# Patient Record
Sex: FEMALE | Race: ASIAN | NOT HISPANIC OR LATINO
[De-identification: names, ages, dates, MRNs, and addresses within clinical notes are randomized per-mention and may not be internally consistent; named-entity substitution may affect disease eponyms.]

---

## 2020-01-14 ENCOUNTER — APPOINTMENT (OUTPATIENT)
Dept: UROLOGY | Facility: CLINIC | Age: 60
End: 2020-01-14
Payer: MEDICAID

## 2020-01-14 VITALS
OXYGEN SATURATION: 95 % | DIASTOLIC BLOOD PRESSURE: 90 MMHG | RESPIRATION RATE: 17 BRPM | SYSTOLIC BLOOD PRESSURE: 147 MMHG | HEART RATE: 75 BPM | BODY MASS INDEX: 29.23 KG/M2 | TEMPERATURE: 97.6 F | HEIGHT: 63 IN | WEIGHT: 165 LBS

## 2020-01-14 DIAGNOSIS — Z80.0 FAMILY HISTORY OF MALIGNANT NEOPLASM OF DIGESTIVE ORGANS: ICD-10-CM

## 2020-01-14 DIAGNOSIS — Z78.9 OTHER SPECIFIED HEALTH STATUS: ICD-10-CM

## 2020-01-14 PROCEDURE — 99204 OFFICE O/P NEW MOD 45 MIN: CPT

## 2020-01-14 NOTE — LETTER BODY
[FreeTextEntry1] : Camilla Connelly MD\par [address]\[place] [address]\par [phone]\par \par Dear Dr. Connelly,\par \par Reason for visit: Abnormal imaging. 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 Patient underwent abdominal MRI, which demonstrated possible benign adrenal adenoma. Patient denies any flank pain, hematuria, or urinary difficulties. Patient denies any urinary incontinence. The patient denies any aggravating or relieving factors. The patient denies any interference of function. The patient is entirely asymptomatic. All other review of systems are negative. She has a family medical history of colon cancer. She has previously undergone hysterectomy. Past medical history, family history and social history were inquired and were noncontributory to current condition. The patient does not use tobacco or drink alcohol. Medications and allergies were reviewed. She has no known allergies to medication. \par \par On examination, the patient is a healthy-appearing woman in no acute distress. She is alert and oriented follows commands. She has normal mood and affect. She is normocephalic. Neck is supple. Oral no thrush. Respirations are unlabored. Abdomen is soft and nontender. Bladder is nonpalpable. No CVA tenderness. Neurologically she is grossly intact. No peripheral edema. Skin without gross abnormality.\par \par Her abdominal MRI demonstrated a 1.4 benign left adrenal adenoma and bilateral renal cysts. There was also a subcentimeter right hepatic hemangioma and scattered hepatic cysts.\par \par Assessment: Abnormal imaging. Possible benign left adrenal adenoma.\par \par I counseled the patient. I recommended she obtain renal panel to evaluate for her adrenal function and ensure that the adenoma is benign. I counseled the patient regarding the procedure. The risks and benefits were discussed. Alternatives were given. I answered the patient questions. The patient will follow-up as directed and will contact me with any questions or concerns. Thank you for the opportunity to participate in the care of Ms. KEATING. I will keep you updated on her progress.\par \par Plan: Renal panel. Follow up in 6 months with US

## 2020-01-14 NOTE — ADDENDUM
[FreeTextEntry1] : Entered by Dante Vasques, acting as scribe for Dr. David Meyers.\par \par The documentation recorded by the scribe accurately reflects the service I personally performed and the decisions made by me.

## 2020-01-14 NOTE — PHYSICAL EXAM
[General Appearance - Well Developed] : well developed [General Appearance - Well Nourished] : well nourished [Normal Appearance] : normal appearance [Well Groomed] : well groomed [General Appearance - In No Acute Distress] : no acute distress [Edema] : no peripheral edema [Exaggerated Use Of Accessory Muscles For Inspiration] : no accessory muscle use [Respiration, Rhythm And Depth] : normal respiratory rhythm and effort [Costovertebral Angle Tenderness] : no ~M costovertebral angle tenderness [Abdomen Soft] : soft [Abdomen Tenderness] : non-tender [Urinary Bladder Findings] : the bladder was normal on palpation [] : no rash [Normal Station and Gait] : the gait and station were normal for the patient's age [No Focal Deficits] : no focal deficits [Oriented To Time, Place, And Person] : oriented to person, place, and time [Affect] : the affect was normal [Not Anxious] : not anxious [No Palpable Adenopathy] : no palpable adenopathy [Mood] : the mood was normal

## 2020-01-19 LAB
ACTH SER-ACNC: 7.4 PG/ML
ALBUMIN SERPL ELPH-MCNC: 4.3 G/DL
ALDOSTERONE SERUM: 4.8 NG/DL
ALP BLD-CCNC: 77 U/L
ALT SERPL-CCNC: 20 U/L
ANION GAP SERPL CALC-SCNC: 15 MMOL/L
APPEARANCE: CLEAR
AST SERPL-CCNC: 19 U/L
BACTERIA: NEGATIVE
BILIRUB SERPL-MCNC: 0.4 MG/DL
BILIRUBIN URINE: NEGATIVE
BLOOD URINE: ABNORMAL
BUN SERPL-MCNC: 12 MG/DL
CALCIUM SERPL-MCNC: 9.3 MG/DL
CHLORIDE SERPL-SCNC: 104 MMOL/L
CO2 SERPL-SCNC: 22 MMOL/L
COLOR: NORMAL
CORTIS SERPL-MCNC: 9 UG/DL
CREAT SERPL-MCNC: 0.5 MG/DL
DHEA-SULFATE, SERUM: 121 UG/DL
GLUCOSE QUALITATIVE U: NEGATIVE
GLUCOSE SERPL-MCNC: 92 MG/DL
HYALINE CASTS: 0 /LPF
KETONES URINE: NEGATIVE
LEUKOCYTE ESTERASE URINE: NEGATIVE
METANEPHRINE, PL: <10 PG/ML
MICROSCOPIC-UA: NORMAL
NITRITE URINE: NEGATIVE
NORMETANEPHRINE, PL: 49 PG/ML
PH URINE: 7
POTASSIUM SERPL-SCNC: 4.4 MMOL/L
PROT SERPL-MCNC: 6.6 G/DL
PROTEIN URINE: NEGATIVE
RED BLOOD CELLS URINE: 5 /HPF
RENIN PLASMA: 6.9 PG/ML
SODIUM SERPL-SCNC: 141 MMOL/L
SPECIFIC GRAVITY URINE: 1.02
SQUAMOUS EPITHELIAL CELLS: 0 /HPF
UROBILINOGEN URINE: NORMAL
WHITE BLOOD CELLS URINE: 0 /HPF

## 2020-01-21 ENCOUNTER — CLINICAL ADVICE (OUTPATIENT)
Age: 60
End: 2020-01-21

## 2020-01-21 ENCOUNTER — APPOINTMENT (OUTPATIENT)
Dept: UROLOGY | Facility: CLINIC | Age: 60
End: 2020-01-21
Payer: MEDICAID

## 2020-01-21 VITALS
HEART RATE: 80 BPM | SYSTOLIC BLOOD PRESSURE: 146 MMHG | WEIGHT: 165 LBS | RESPIRATION RATE: 17 BRPM | DIASTOLIC BLOOD PRESSURE: 84 MMHG | TEMPERATURE: 98.3 F | OXYGEN SATURATION: 97 % | BODY MASS INDEX: 29.23 KG/M2

## 2020-01-21 PROCEDURE — 52000 CYSTOURETHROSCOPY: CPT

## 2020-01-21 PROCEDURE — 76775 US EXAM ABDO BACK WALL LIM: CPT

## 2020-01-21 RX ORDER — CIPROFLOXACIN HYDROCHLORIDE 500 MG/1
500 TABLET, FILM COATED ORAL
Refills: 0 | Status: COMPLETED | OUTPATIENT
Start: 2020-01-21

## 2020-01-21 RX ORDER — CIPROFLOXACIN HYDROCHLORIDE 500 MG/1
500 TABLET, FILM COATED ORAL
Qty: 2 | Refills: 0 | Status: ACTIVE | COMMUNITY
Start: 2020-01-21

## 2020-01-21 NOTE — LETTER BODY
[FreeTextEntry1] : Camilla Connelly MD\par [address]\[place] [address]\par [phone]\par \par Dear Dr. Connelly,\par \par Reason for visit: Abnormal imaging. 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 Patient underwent abdominal MRI, which demonstrated possible benign adrenal adenoma. Patient returns today for cystoscopy and renal ultrasound. Since her last visit, she denies any changes in health and offers no interval complaints. Past medical history, family history and social history were unchanged. Medications and allergies were reviewed. She has no known allergies to medication. \par \par On examination, the patient is a healthy-appearing woman in no acute distress. She is alert and oriented follows commands. She has normal mood and affect. She is normocephalic. Neck is supple. Oral no thrush. Respirations are unlabored. Abdomen is soft and nontender. Bladder is nonpalpable. No CVA tenderness. Neurologically she is grossly intact. No peripheral edema. Skin without gross abnormality.\par \par I personally reviewed ultrasound images with the patient today. Images demonstrated a 2.0 cm exophytic simple cyst visualized off the left mid pole.  Both kidneys appear normal in size and echogenicity. No stones, solid masses or hydronephrosis visualized.\par Cystoscopy today was unremarkable. \par \par Assessment: Abnormal imaging. Possible benign left adrenal adenoma. \par \par I counseled the patient. Patient's ultrasound today was stable and her cystoscopy today were unremarkable. I recommend she follow up in 6 months to ensure stability. Patient understands that if she develops gross hematuria or any urinary discomfort, she will contact me for further evaluation. The risks and benefits were discussed. Alternatives were given. I answered the patient questions. The patient will follow-up as directed and will contact me with any questions or concerns. Thank you for the opportunity to participate in the care of Ms. KEATING. I will keep you updated on her progress.\par \par Plan:  Follow up in 6 months.\par

## 2020-01-21 NOTE — ADDENDUM
[FreeTextEntry1] : Entered by El Fong, acting as scribe for Dr. David Meyers.\par \par The documentation recorded by the scribe accurately reflects the service I personally performed and the decisions made by me.

## 2020-01-24 LAB
CORTIS 24H UR-MCNC: 24 H
CORTIS 24H UR-MRATE: 55 MCG/24 H
METAINT: 24 H
METANEPH 24H UR-MRATE: 84 MCG/24 H
METANEPH UR-MCNC: 925 ML
METANEPHS UR-MCNC: 520 MCG/24 H
NORMETANEPHRINE 24H UR-MCNC: 436 MCG/24 H
SPECIMEN VOL 24H UR: 925 ML
SPECIMEN VOL 24H UR: 925 ML
URINE CYTOLOGY: NORMAL
VMA 24H UR-MCNC: 4.3 MG/24 H
VMA SERPL-MCNC: 24 H

## 2020-01-27 LAB
DOPAMINE UR-MCNC: 245 UG/L
DOPAMINE, UR, 24HR: 227 UG/24 HR
EPINEPH UR-MCNC: 6 UG/L
EPINEPHRINE, U, 24HR: 6 UG/24 HR
NOREPINEPH UR-MCNC: 71 UG/L
NOREPINEPHRINE,U,24H: 66 UG/24 HR

## 2020-08-14 ENCOUNTER — APPOINTMENT (OUTPATIENT)
Dept: UROLOGY | Facility: CLINIC | Age: 60
End: 2020-08-14
Payer: MEDICAID

## 2020-08-14 VITALS
BODY MASS INDEX: 29.41 KG/M2 | OXYGEN SATURATION: 97 % | WEIGHT: 166 LBS | TEMPERATURE: 97.8 F | RESPIRATION RATE: 17 BRPM | SYSTOLIC BLOOD PRESSURE: 139 MMHG | HEART RATE: 101 BPM | DIASTOLIC BLOOD PRESSURE: 81 MMHG

## 2020-08-14 PROCEDURE — 99213 OFFICE O/P EST LOW 20 MIN: CPT

## 2020-08-17 NOTE — LETTER BODY
[FreeTextEntry1] : Camilla Connelly MD\par [address]\[place] [address]\par [phone]\par \par Dear Dr. Connelly,\par \par Reason for visit: Abnormal imaging. Left adrenal adenoma. 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 Patient underwent abdominal MRI in December 2019, which demonstrated benign adrenal adenoma. She obtained a negative hematuria evaluation last visit in January. Her workup including cytology, renal ultrasound, and cystoscopy were unremarkable. Patient returns today for follow-up. Since her last visit, she has been well. She offers no interval changes in health or urinary complaints. Past medical history, family history and social history were unchanged. Medications and allergies were reviewed. She has no known allergies to medication. \par \par On examination, the patient is a healthy-appearing woman in no acute distress. She is alert and oriented follows commands. She has normal mood and affect. She is normocephalic. Neck is supple. Oral no thrush. Respirations are unlabored. Abdomen is soft and nontender. Bladder is nonpalpable. No CVA tenderness. Neurologically she is grossly intact. No peripheral edema. Skin without gross abnormality.\par \par Assessment: Abnormal imaging. Microscopic hematuria, resolved.\par \par I counseled the patient. I reassured her as her hematuria evaluation was negative. I recommend she follow up in a year to ensure stability. Patient understands that if she develops gross hematuria or any urinary discomfort, she will contact me for further evaluation. The risks and benefits were discussed. Alternatives were given. I answered the patient questions. The patient will follow-up as directed and will contact me with any questions or concerns. Thank you for the opportunity to participate in the care of Ms. KEATING. I will keep you updated on her progress.\par \par Plan: Follow up in 1 year.

## 2021-02-23 ENCOUNTER — APPOINTMENT (OUTPATIENT)
Dept: UROLOGY | Facility: CLINIC | Age: 61
End: 2021-02-23
Payer: MEDICAID

## 2021-02-23 VITALS
HEART RATE: 88 BPM | TEMPERATURE: 98 F | RESPIRATION RATE: 18 BRPM | SYSTOLIC BLOOD PRESSURE: 121 MMHG | OXYGEN SATURATION: 97 % | DIASTOLIC BLOOD PRESSURE: 82 MMHG | WEIGHT: 158 LBS | BODY MASS INDEX: 27.99 KG/M2

## 2021-02-23 DIAGNOSIS — D35.02 BENIGN NEOPLASM OF LEFT ADRENAL GLAND: ICD-10-CM

## 2021-02-23 DIAGNOSIS — R31.21 ASYMPTOMATIC MICROSCOPIC HEMATURIA: ICD-10-CM

## 2021-02-23 DIAGNOSIS — Z00.00 ENCOUNTER FOR GENERAL ADULT MEDICAL EXAMINATION W/OUT ABNORMAL FINDINGS: ICD-10-CM

## 2021-02-23 PROCEDURE — 99072 ADDL SUPL MATRL&STAF TM PHE: CPT

## 2021-02-23 PROCEDURE — 99214 OFFICE O/P EST MOD 30 MIN: CPT

## 2021-02-23 RX ORDER — SULFAMETHOXAZOLE AND TRIMETHOPRIM 800; 160 MG/1; MG/1
800-160 TABLET ORAL
Qty: 10 | Refills: 0 | Status: ACTIVE | COMMUNITY
Start: 2021-02-23 | End: 1900-01-01

## 2021-02-23 NOTE — LETTER BODY
[FreeTextEntry1] : Camilla Connelly MD\par [address]\[place] [address]\par [phone]\par \par Dear Dr. Connelly,\par \par Reason for visit: Abnormal imaging. Left adrenal adenoma. 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 Patient underwent abdominal MRI in December 2019, which demonstrated a benign left adrenal adenoma. She underwent a negative hematuria evaluation in January 2020. The patient returns today for follow-up. Since  she was last seen, the patient complains of pelvic pain. She denies any other changes in health. The patient is overall well. Past medical history, family history and social history were unchanged. Medications and allergies were reviewed. She has no known allergies to medication. \par \par On examination, the patient is a healthy-appearing woman in no acute distress. She is alert and oriented follows commands. She has normal mood and affect. She is normocephalic. Neck is supple. Oral no thrush. Respirations are unlabored. Abdomen is soft and nontender. Bladder is nonpalpable. No CVA tenderness. Neurologically she is grossly intact. No peripheral edema. Skin without gross abnormality.\par \par Her adrenal panel in January 2020 demonstrated elevated 24-Hour urine cortisol, 55 mcg/24 h, but was otherwise unremarkable.\par \par Assessment: Abnormal imaging. Left adrenal adenoma. Pelvic pain.\par \par I counseled the patient. In terms of her pelvic pain, I recommended the patient obtain urinalysis and urine culture today to evaluate for infection. I also recommended she begin a course of Bactrim. I discussed the potential side effects of the medication. I counseled the patient on its use and side effects. If the patient develops any side effects, the patient will discontinue the medication and contact me. The patient will also obtain chlamydia/GC amplification and ureaplasma/mycoplasma genital culture today for further evaluation. Risks and alternatives were discussed. I answered the patient questions. The patient will follow-up as directed and will contact me with any questions or concerns. Thank you for the opportunity to participate in the care of Ms. KEATING. I will keep you updated on her progress.\par \par Plan: Urinalysis. Urine culture. Course of Bactrim. Chlamydia/GC amplification. Ureaplasma/mycoplasma genital culture. Follow-up as directed.

## 2021-02-23 NOTE — ADDENDUM
[FreeTextEntry1] : Entered by Malik Lauren, acting as scribe for Dr. David Meyers.\par \par The documentation recorded by the scribe accurately reflects the service I personally performed and the decisions made by me.\par

## 2021-02-24 LAB
APPEARANCE: CLEAR
BACTERIA UR CULT: NORMAL
BACTERIA: NEGATIVE
BILIRUBIN URINE: NEGATIVE
BLOOD URINE: ABNORMAL
C TRACH RRNA SPEC QL NAA+PROBE: NOT DETECTED
COLOR: YELLOW
GLUCOSE QUALITATIVE U: NEGATIVE
HYALINE CASTS: 0 /LPF
KETONES URINE: ABNORMAL
LEUKOCYTE ESTERASE URINE: NEGATIVE
MICROSCOPIC-UA: NORMAL
N GONORRHOEA RRNA SPEC QL NAA+PROBE: NOT DETECTED
NITRITE URINE: NEGATIVE
PH URINE: 6
PROTEIN URINE: NORMAL
RED BLOOD CELLS URINE: 3 /HPF
SOURCE AMPLIFICATION: NORMAL
SPECIFIC GRAVITY URINE: 1.03
SQUAMOUS EPITHELIAL CELLS: 1 /HPF
UROBILINOGEN URINE: NORMAL
WHITE BLOOD CELLS URINE: 0 /HPF

## 2021-03-02 LAB
MYCOPLASMA HOMINIS CULTURE: NEGATIVE
UREAPLASMA CULTURE: NEGATIVE